# Patient Record
Sex: MALE | Race: WHITE
[De-identification: names, ages, dates, MRNs, and addresses within clinical notes are randomized per-mention and may not be internally consistent; named-entity substitution may affect disease eponyms.]

---

## 2021-03-20 ENCOUNTER — HOSPITAL ENCOUNTER (EMERGENCY)
Dept: HOSPITAL 47 - EC | Age: 21
Discharge: HOME | End: 2021-03-20
Payer: COMMERCIAL

## 2021-03-20 VITALS
SYSTOLIC BLOOD PRESSURE: 113 MMHG | DIASTOLIC BLOOD PRESSURE: 74 MMHG | HEART RATE: 98 BPM | RESPIRATION RATE: 18 BRPM | TEMPERATURE: 98.4 F

## 2021-03-20 DIAGNOSIS — M54.9: Primary | ICD-10-CM

## 2021-03-20 PROCEDURE — 99283 EMERGENCY DEPT VISIT LOW MDM: CPT

## 2021-03-20 PROCEDURE — 71046 X-RAY EXAM CHEST 2 VIEWS: CPT

## 2021-03-20 NOTE — ED
Back Pain HPI





- General


Chief Complaint: Back Pain/Injury


Stated Complaint: Back injury


Time Seen by Provider: 03/20/21 19:19


Source: patient





- History of Present Illness


Initial Comments: 


20-year-old male presenting today for chief complaint of back pain.  Patient 

states he noticed that his back seemed more prominent in certain areas of the 

upper spine.  Patient states has been painful.  Patient does work in a factory. 

He doesn't follow strict injury IV drug use weakness sensation deficits or 

radiation of the pain.  Denies any loss of bowel or bladder control.  Patient 

states he took a picture sent his dad but it looked swollen and told him to come

to the ER.  She states that he became very anxious about this and was 

experiencing dyspnea. He denies chest pain. patient has no additional complai

nts. Upon arrival patient appears well nontoxic in no acute distress.








- Related Data


                                Home Medications











 Medication  Instructions  Recorded  Confirmed


 


No Known Home Medications  11/14/18 11/14/18











                                    Allergies











Allergy/AdvReac Type Severity Reaction Status Date / Time


 


No Known Allergies Allergy   Verified 03/20/21 19:17














Review of Systems


ROS Statement: 


Those systems with pertinent positive or pertinent negative responses have been 

documented in the HPI.





ROS Other: All systems not noted in ROS Statement are negative.





Past Medical History


Past Medical History: Seizure Disorder


History of Any Multi-Drug Resistant Organisms: None Reported


Past Surgical History: No Surgical Hx Reported


Past Psychological History: No Psychological Hx Reported


Smoking Status: Never smoker


Past Alcohol Use History: None Reported


Past Drug Use History: None Reported





General Exam





- General Exam Comments


Initial Comments: 


General:  The patient is awake and alert, in no distress, and does not appear 

acutely ill. 


Eye:  Pupils are equal, round and reactive to light, extra-ocular movements are 

intact.  No nystagmus.  There is normal conjunctiva bilaterally.  No signs of 

icterus.  


Ears, nose, mouth and throat:  There are moist mucous membranes and no oral 

lesions. 


Neck:  The neck is supple, there is no tenderness or JVD. Prominent C7. no pain 




Cardiovascular:  There is a regular rate and rhythm. No murmur, rub or gallop is

appreciated.


Respiratory:  Lungs are clear to auscultation, respirations are non-labored, 

breath sounds are equal.  No wheezes, stridor, rales, or rhonchi.


Musculoskeletal:  Paraspinal tenderness of the upper thoracic spine. No lower 

tenderness. Patient hasno swelling, no redness. and minimal midlines pain. Segundo

bend foreward testing reveals very slight upper thoracic curvature to the right.

Normal ROM, no tenderness.  Strength 5/5. Sensation intact. Radial pulses equal 

bilaterally 2+.  


Neurological:  A&O x 3. CN II-XII intact, There are no obvious motor or sensory 

deficits. Coordination appears grossly intact. Speech is normal.


Skin:  Skin is warm and dry and no rashes or lesions are noted. 


Psychiatric:  Cooperative, appropriate mood & affect, normal judgment.  








Course


                                   Vital Signs











  03/20/21





  19:14


 


Temperature 98.4 F


 


Pulse Rate 98


 


Respiratory 18





Rate 


 


Blood Pressure 113/74


 


O2 Sat by Pulse 100





Oximetry 














Medical Decision Making





- Medical Decision Making


Normal inspection no abnormal swelling, or changes of the spine appreciated. 

Patient did have photo in red lighting that appears like there was prominence 

however this is felt to be anatomical at c7 as well as positional/lighting. 

Given thoracic in nature, patient states he became anxious and had dyspnea that 

subsided with his tall, thin stature i obtained a chest XR to ensure no 

spontaneous pnuemothorax. patient CXR WNL. he appears well nontoxic and will be 

discharged with PCP f/u. Patient is agreeable to dsicharge. patient is 

accompanied by grandmother. case discussed with Dr. Acharya who is agreeable 

to care plan and discharge.








Disposition


Clinical Impression: 


 Back pain





Disposition: HOME SELF-CARE


Condition: Good


Instructions (If sedation given, give patient instructions):  Back Pain (ED)


Additional Instructions: 


Please use medication as discussed.  Please follow-up with family doctor in the 

next 2 days.  Please return to emergency room if the symptoms increase or worsen

or for any other concerns.


Is patient prescribed a controlled substance at d/c from ED?: No


Referrals: 


None,Stated [Primary Care Provider] - 1-2 days


Time of Disposition: 20:00

## 2021-03-20 NOTE — XR
EXAMINATION TYPE: XR chest 2V

 

DATE OF EXAM: 3/20/2021

 

COMPARISON: 11/14/2018

 

HISTORY: Short of breath

 

TECHNIQUE:

 

FINDINGS: Heart and mediastinum are normal. Lungs are clear. Diaphragm is normal. Bony thorax appears
 normal.

 

IMPRESSION: Normal chest. No change.

## 2021-08-12 ENCOUNTER — HOSPITAL ENCOUNTER (EMERGENCY)
Dept: HOSPITAL 47 - EC | Age: 21
Discharge: HOME | End: 2021-08-12
Payer: COMMERCIAL

## 2021-08-12 VITALS — DIASTOLIC BLOOD PRESSURE: 87 MMHG | TEMPERATURE: 98 F | SYSTOLIC BLOOD PRESSURE: 121 MMHG | HEART RATE: 68 BPM

## 2021-08-12 VITALS — RESPIRATION RATE: 16 BRPM

## 2021-08-12 DIAGNOSIS — H53.8: ICD-10-CM

## 2021-08-12 DIAGNOSIS — R42: Primary | ICD-10-CM

## 2021-08-12 LAB
ALBUMIN SERPL-MCNC: 4.8 G/DL (ref 3.5–5)
ALP SERPL-CCNC: 76 U/L (ref 38–126)
ALT SERPL-CCNC: 18 U/L (ref 4–49)
ANION GAP SERPL CALC-SCNC: 10 MMOL/L
AST SERPL-CCNC: 35 U/L (ref 17–59)
BASOPHILS # BLD AUTO: 0 K/UL (ref 0–0.2)
BASOPHILS NFR BLD AUTO: 1 %
BUN SERPL-SCNC: 16 MG/DL (ref 9–20)
CALCIUM SPEC-MCNC: 10 MG/DL (ref 8.4–10.2)
CHLORIDE SERPL-SCNC: 102 MMOL/L (ref 98–107)
CO2 SERPL-SCNC: 27 MMOL/L (ref 22–30)
EOSINOPHIL # BLD AUTO: 0.1 K/UL (ref 0–0.7)
EOSINOPHIL NFR BLD AUTO: 2 %
ERYTHROCYTE [DISTWIDTH] IN BLOOD BY AUTOMATED COUNT: 4.89 M/UL (ref 4.3–5.9)
ERYTHROCYTE [DISTWIDTH] IN BLOOD: 12.9 % (ref 11.5–15.5)
GLUCOSE SERPL-MCNC: 77 MG/DL (ref 74–99)
HCT VFR BLD AUTO: 43 % (ref 39–53)
HGB BLD-MCNC: 15.4 GM/DL (ref 13–17.5)
LYMPHOCYTES # SPEC AUTO: 1.4 K/UL (ref 1–4.8)
LYMPHOCYTES NFR SPEC AUTO: 34 %
MCH RBC QN AUTO: 31.5 PG (ref 25–35)
MCHC RBC AUTO-ENTMCNC: 35.7 G/DL (ref 31–37)
MCV RBC AUTO: 88 FL (ref 80–100)
MONOCYTES # BLD AUTO: 0.3 K/UL (ref 0–1)
MONOCYTES NFR BLD AUTO: 7 %
NEUTROPHILS # BLD AUTO: 2.3 K/UL (ref 1.3–7.7)
NEUTROPHILS NFR BLD AUTO: 54 %
PH UR: 6 [PH] (ref 5–8)
PLATELET # BLD AUTO: 179 K/UL (ref 150–450)
POTASSIUM SERPL-SCNC: 4.4 MMOL/L (ref 3.5–5.1)
PROT SERPL-MCNC: 7.4 G/DL (ref 6.3–8.2)
SODIUM SERPL-SCNC: 139 MMOL/L (ref 137–145)
SP GR UR: 1.01 (ref 1–1.03)
UROBILINOGEN UR QL STRIP: <2 MG/DL (ref ?–2)
WBC # BLD AUTO: 4.3 K/UL (ref 4–11)

## 2021-08-12 PROCEDURE — 85025 COMPLETE CBC W/AUTO DIFF WBC: CPT

## 2021-08-12 PROCEDURE — 36415 COLL VENOUS BLD VENIPUNCTURE: CPT

## 2021-08-12 PROCEDURE — 99284 EMERGENCY DEPT VISIT MOD MDM: CPT

## 2021-08-12 PROCEDURE — 80053 COMPREHEN METABOLIC PANEL: CPT

## 2021-08-12 PROCEDURE — 96361 HYDRATE IV INFUSION ADD-ON: CPT

## 2021-08-12 PROCEDURE — 81003 URINALYSIS AUTO W/O SCOPE: CPT

## 2021-08-12 PROCEDURE — 83605 ASSAY OF LACTIC ACID: CPT

## 2021-08-12 PROCEDURE — 93005 ELECTROCARDIOGRAM TRACING: CPT

## 2021-08-12 PROCEDURE — 84484 ASSAY OF TROPONIN QUANT: CPT

## 2021-08-12 PROCEDURE — 96360 HYDRATION IV INFUSION INIT: CPT

## 2021-08-12 NOTE — ED
General Adult HPI





- General


Chief complaint: Dizziness


Stated complaint: Dizziness


Time Seen by Provider: 08/12/21 10:32


Source: patient


Mode of arrival: wheelchair


Limitations: no limitations





- History of Present Illness


Initial comments: 


21 year-old male patient presents to the emergency department for evaluation of 

dizziness and blurred vision. Patient states symptoms started around 0800 this 

morning while at work. 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 States 

that it has been really hot in the factory for the last several days. States he 

has been sweating a lot. States he drinks "a lot" of water. States he did have 

mild headache earlier today but it has resolved. He denies any head injury. 

Denies chest pain or shortness of breath. States he has had these same symptoms 

numerous times in the past, has never been evaluated.  Patient denies any recent

rash, cough, shortness of breath, chest pain, abdominal pain, nausea, vomiting, 

diarrhea, constipation, back pain, numbness, tingling, hematuria, dysuria, 

urinary urgency, urinary frequency, or any other complaints.








- Related Data


                                Home Medications











 Medication  Instructions  Recorded  Confirmed


 


No Known Home Medications  11/14/18 08/12/21











                                    Allergies











Allergy/AdvReac Type Severity Reaction Status Date / Time


 


No Known Allergies Allergy   Verified 08/12/21 11:13














Review of Systems


ROS Statement: 


Those systems with pertinent positive or pertinent negative responses have been 

documented in the HPI.





ROS Other: All systems not noted in ROS Statement are negative.





Past Medical History


Past Medical History: Seizure Disorder


History of Any Multi-Drug Resistant Organisms: None Reported


Past Surgical History: No Surgical Hx Reported


Past Psychological History: No Psychological Hx Reported


Smoking Status: Never smoker


Past Alcohol Use History: None Reported


Past Drug Use History: None Reported





General Exam


Limitations: no limitations


General appearance: alert, in no apparent distress, other (This is a well-

developed, well-nourished adult male patient in no acute distress.  Vital signs 

upon presentation are temperature 97.8F, pulse 68, respirations 18, blood 

pressure 121/81, pulse ox 100% on room air.)


Eye exam: Present: normal appearance, PERRL, EOMI.  Absent: scleral icterus, 

conjunctival injection, periorbital swelling


ENT exam: Present: normal exam, normal oropharynx, mucous membranes moist


Respiratory exam: Present: normal lung sounds bilaterally.  Absent: respiratory 

distress, wheezes, rales, rhonchi, stridor


Cardiovascular Exam: Present: regular rate, normal rhythm, normal heart sounds. 

Absent: systolic murmur, diastolic murmur, rubs, gallop, clicks


GI/Abdominal exam: Present: soft, normal bowel sounds.  Absent: distended, 

tenderness, guarding, rebound, rigid


Neurological exam: Present: alert, oriented X3, CN II-XII intact


  ** Expanded


Speech: Present: fluid speech


Cranial nerves: EOM's Intact: Normal, Nystagmus: Normal


Motor strength exam: RUE: 5, LUE: 5, RLE: 5, LLE: 5


Psychiatric exam: Present: normal affect, normal mood


Skin exam: Present: warm, dry, intact, normal color.  Absent: rash





Course


                                   Vital Signs











  08/12/21 08/12/21 08/12/21





  10:18 12:58 13:09


 


Temperature 97.8 F  98.0 F


 


Pulse Rate 68 67 68


 


Respiratory 18 16 16





Rate   


 


Blood Pressure 121/81 128/80 121/87


 


O2 Sat by Pulse 100 100 99





Oximetry   














EKG Findings





- EKG Comments:


EKG Findings:: EKG obtained at 1026 shows normal sinus rhythm with an incomplete

right bundle branch block.  Ventricular rate is 69, MO interval 194, QRS 

duration 94, , .  No evidence of ST elevation or depression.





Medical Decision Making





- Medical Decision Making


21-year-old male patient presents to the emergency department today for 

evaluation of dizziness and blurred vision.  Physical examination is 

unremarkable.  He is neurologically intact with no focal deficits.  EKG shows 

incomplete right bundle branch block.  Vital signs are within normal range.  

Labs reviewed and were unremarkable.  I did discuss findings and results with 

him.  He is instructed to increase fluids and to rest.  He is instructed to 

follow-up with his primary care physician for recheck in 1-2 days.  He is 

instructed to discuss possible heart monitoring since he has been having 

frequent dizzy episodes.  Return parameters were discussed in detail.  He 

verbalizes understanding and agrees with this plan.  Case discussed with my 

attending Dr. Daugherty.








- Lab Data


Result diagrams: 


                                 08/12/21 11:25





                                 08/12/21 11:25


                                   Lab Results











  08/12/21 08/12/21 08/12/21 Range/Units





  11:25 11:25 11:25 


 


WBC  4.3    (4.0-11.0)  k/uL


 


RBC  4.89    (4.30-5.90)  m/uL


 


Hgb  15.4    (13.0-17.5)  gm/dL


 


Hct  43.0    (39.0-53.0)  %


 


MCV  88.0    (80.0-100.0)  fL


 


MCH  31.5    (25.0-35.0)  pg


 


MCHC  35.7    (31.0-37.0)  g/dL


 


RDW  12.9    (11.5-15.5)  %


 


Plt Count  179    (150-450)  k/uL


 


MPV  7.4    


 


Neutrophils %  54    %


 


Lymphocytes %  34    %


 


Monocytes %  7    %


 


Eosinophils %  2    %


 


Basophils %  1    %


 


Neutrophils #  2.3    (1.3-7.7)  k/uL


 


Lymphocytes #  1.4    (1.0-4.8)  k/uL


 


Monocytes #  0.3    (0-1.0)  k/uL


 


Eosinophils #  0.1    (0-0.7)  k/uL


 


Basophils #  0.0    (0-0.2)  k/uL


 


Sodium   139   (137-145)  mmol/L


 


Potassium   4.4   (3.5-5.1)  mmol/L


 


Chloride   102   ()  mmol/L


 


Carbon Dioxide   27   (22-30)  mmol/L


 


Anion Gap   10   mmol/L


 


BUN   16   (9-20)  mg/dL


 


Creatinine   0.87   (0.66-1.25)  mg/dL


 


Est GFR (CKD-EPI)AfAm   >90   (>60 ml/min/1.73 sqM)  


 


Est GFR (CKD-EPI)NonAf   >90   (>60 ml/min/1.73 sqM)  


 


Glucose   77   (74-99)  mg/dL


 


Plasma Lactic Acid Clint    0.7  (0.7-2.0)  mmol/L


 


Calcium   10.0   (8.4-10.2)  mg/dL


 


Total Bilirubin   1.6 H   (0.2-1.3)  mg/dL


 


AST   35   (17-59)  U/L


 


ALT   18   (4-49)  U/L


 


Alkaline Phosphatase   76   ()  U/L


 


Troponin I     (0.000-0.034)  ng/mL


 


Total Protein   7.4   (6.3-8.2)  g/dL


 


Albumin   4.8   (3.5-5.0)  g/dL


 


Urine Color     


 


Urine Appearance     (Clear)  


 


Urine pH     (5.0-8.0)  


 


Ur Specific Gravity     (1.001-1.035)  


 


Urine Protein     (Negative)  


 


Urine Glucose (UA)     (Negative)  


 


Urine Ketones     (Negative)  


 


Urine Blood     (Negative)  


 


Urine Nitrite     (Negative)  


 


Urine Bilirubin     (Negative)  


 


Urine Urobilinogen     (<2.0)  mg/dL


 


Ur Leukocyte Esterase     (Negative)  














  08/12/21 08/12/21 Range/Units





  11:25 11:30 


 


WBC    (4.0-11.0)  k/uL


 


RBC    (4.30-5.90)  m/uL


 


Hgb    (13.0-17.5)  gm/dL


 


Hct    (39.0-53.0)  %


 


MCV    (80.0-100.0)  fL


 


MCH    (25.0-35.0)  pg


 


MCHC    (31.0-37.0)  g/dL


 


RDW    (11.5-15.5)  %


 


Plt Count    (150-450)  k/uL


 


MPV    


 


Neutrophils %    %


 


Lymphocytes %    %


 


Monocytes %    %


 


Eosinophils %    %


 


Basophils %    %


 


Neutrophils #    (1.3-7.7)  k/uL


 


Lymphocytes #    (1.0-4.8)  k/uL


 


Monocytes #    (0-1.0)  k/uL


 


Eosinophils #    (0-0.7)  k/uL


 


Basophils #    (0-0.2)  k/uL


 


Sodium    (137-145)  mmol/L


 


Potassium    (3.5-5.1)  mmol/L


 


Chloride    ()  mmol/L


 


Carbon Dioxide    (22-30)  mmol/L


 


Anion Gap    mmol/L


 


BUN    (9-20)  mg/dL


 


Creatinine    (0.66-1.25)  mg/dL


 


Est GFR (CKD-EPI)AfAm    (>60 ml/min/1.73 sqM)  


 


Est GFR (CKD-EPI)NonAf    (>60 ml/min/1.73 sqM)  


 


Glucose    (74-99)  mg/dL


 


Plasma Lactic Acid Clint    (0.7-2.0)  mmol/L


 


Calcium    (8.4-10.2)  mg/dL


 


Total Bilirubin    (0.2-1.3)  mg/dL


 


AST    (17-59)  U/L


 


ALT    (4-49)  U/L


 


Alkaline Phosphatase    ()  U/L


 


Troponin I  <0.012   (0.000-0.034)  ng/mL


 


Total Protein    (6.3-8.2)  g/dL


 


Albumin    (3.5-5.0)  g/dL


 


Urine Color   Yellow  


 


Urine Appearance   Clear  (Clear)  


 


Urine pH   6.0  (5.0-8.0)  


 


Ur Specific Gravity   1.015  (1.001-1.035)  


 


Urine Protein   Negative  (Negative)  


 


Urine Glucose (UA)   Negative  (Negative)  


 


Urine Ketones   Negative  (Negative)  


 


Urine Blood   Negative  (Negative)  


 


Urine Nitrite   Negative  (Negative)  


 


Urine Bilirubin   Negative  (Negative)  


 


Urine Urobilinogen   <2.0  (<2.0)  mg/dL


 


Ur Leukocyte Esterase   Negative  (Negative)  














Disposition


Clinical Impression: 


 Dizziness, Blurred vision





Disposition: HOME SELF-CARE


Condition: Good


Instructions (If sedation given, give patient instructions):  Blurred Vision 

(ED), Dizziness (ED)


Additional Instructions: 


Increase fluids. Rest. Follow up with your primary care physician for recheck in

1-2 days. Discuss possible heart monitoring. Return for any new, worsening, or 

concerning symptoms.


Is patient prescribed a controlled substance at d/c from ED?: No


Referrals: 


None,Stated [Primary Care Provider] - 1-2 days


Time of Disposition: 12:41

## 2021-08-18 ENCOUNTER — HOSPITAL ENCOUNTER (EMERGENCY)
Dept: HOSPITAL 47 - EC | Age: 21
LOS: 1 days | Discharge: HOME | End: 2021-08-19
Payer: COMMERCIAL

## 2021-08-18 DIAGNOSIS — J06.9: Primary | ICD-10-CM

## 2021-08-18 DIAGNOSIS — F17.210: ICD-10-CM

## 2021-08-18 DIAGNOSIS — Z20.822: ICD-10-CM

## 2021-08-18 PROCEDURE — 71045 X-RAY EXAM CHEST 1 VIEW: CPT

## 2021-08-18 PROCEDURE — 87635 SARS-COV-2 COVID-19 AMP PRB: CPT

## 2021-08-18 PROCEDURE — 99283 EMERGENCY DEPT VISIT LOW MDM: CPT

## 2021-08-19 VITALS
DIASTOLIC BLOOD PRESSURE: 63 MMHG | SYSTOLIC BLOOD PRESSURE: 107 MMHG | TEMPERATURE: 98.2 F | HEART RATE: 80 BPM | RESPIRATION RATE: 16 BRPM

## 2021-08-19 NOTE — ED
URI HPI





- General


Chief Complaint: Upper Respiratory Infection


Stated Complaint: cough, fever


Time Seen by Provider: 08/19/21 00:06


Source: patient, family


Mode of arrival: wheelchair


Limitations: no limitations





- History of Present Illness


Initial Comments: 


21-year-old male patient presents to the emergency department today for 

evaluation of cough, congestion, sore throat, body aches, fever, chills.  

Patient states symptoms started about 24 hours ago.  Denies taking any 

medication for his symptoms.  Does admit to smoking cigarettes.  Has not had COV

ID-19.  Mother is sick with similar symptoms.   Patient denies any recent rash, 

shortness of breath, chest pain, abdominal pain, nausea, vomiting, diarrhea, 

constipation, back pain, numbness, tingling, dizziness, weakness, hematuria, 

dysuria, urinary urgency, urinary frequency, headache, visual changes, or any 

other complaints.








- Related Data


                                  Previous Rx's











 Medication  Instructions  Recorded


 


Ibuprofen [Motrin] 600 mg PO Q8HR PRN #30 tab 08/19/21


 


guaiFENesin-/30MG [Mucinex 2 each PO Q12HR PRN #20 tab.er.12h 08/19/21





Dm]  











                                    Allergies











Allergy/AdvReac Type Severity Reaction Status Date / Time


 


No Known Allergies Allergy   Verified 08/18/21 23:14














Review of Systems


ROS Statement: 


Those systems with pertinent positive or pertinent negative responses have been 

documented in the HPI.





ROS Other: All systems not noted in ROS Statement are negative.





Past Medical History


Past Medical History: Seizure Disorder


History of Any Multi-Drug Resistant Organisms: None Reported


Past Surgical History: No Surgical Hx Reported


Past Psychological History: No Psychological Hx Reported


Smoking Status: Never smoker


Past Alcohol Use History: None Reported


Past Drug Use History: None Reported





General Exam


Limitations: no limitations


General appearance: alert, in no apparent distress, other (This is a well-

developed, well-nourished adult male patient in no acute distress.  Vital signs 

upon presentation are temperature 100.7F, pulse 83, respirations 18, blood 

pressure 132/81, pulse ox 100% on room air.)


ENT exam: Present: normal exam, normal oropharynx, mucous membranes moist


Respiratory exam: Present: normal lung sounds bilaterally.  Absent: respiratory 

distress, wheezes, rales, rhonchi, stridor


Cardiovascular Exam: Present: regular rate, normal rhythm, normal heart sounds. 

Absent: systolic murmur, diastolic murmur, rubs, gallop, clicks


GI/Abdominal exam: Present: soft, normal bowel sounds.  Absent: distended, 

tenderness, guarding, rebound, rigid


Neurological exam: Present: alert, oriented X3, CN II-XII intact


Psychiatric exam: Present: normal affect, normal mood


Skin exam: Present: warm, dry, intact, normal color.  Absent: rash





Course


                                   Vital Signs











  08/18/21 08/19/21 08/19/21





  23:11 00:52 01:33


 


Temperature 100.7 F H 98.2 F 


 


Pulse Rate 83 80 


 


Respiratory 18 16 16





Rate   


 


Blood Pressure 132/81 107/63 


 


O2 Sat by Pulse 100 97 





Oximetry   














Medical Decision Making





- Medical Decision Making


21-year-old male patient presents to the emergency department today for 

evaluation of upper respiratory symptoms and fever.  Physical examination did 

reveal clear equal lung sounds.  There is some mild pharyngeal erythema.  No 

evidence for otitis media.  Chest x-ray was negative. COVID-19 test is negative.

He will be discharged with Mucinex and ibuprofen. Instructed to rest and 

increase fluids. Instructed to follow-up the primary care physician for recheck 

in 1-2 days.  Return parameters were discussed in detail.  Patient verbalizes 

understanding and agrees with this plan.  My attending is Dr. Moreno.








- Lab Data


                                   Lab Results











  08/18/21 Range/Units





  23:33 


 


Coronavirus (PCR)  Not Detected  (Not Detectd)  














Disposition


Clinical Impression: 


 Viral upper respiratory infection





Disposition: HOME SELF-CARE


Condition: Good


Instructions (If sedation given, give patient instructions):  Upper Respiratory 

Infection (ED)


Additional Instructions: 


Rest.  Increase fluids.  Alternate Tylenol and Motrin for pain and fever 

control.  Take medications as directed for symptom relief.  Return to the 

emergency department for any new, worsening, or concerning symptoms.


Prescriptions: 


Ibuprofen [Motrin] 600 mg PO Q8HR PRN #30 tab


 PRN Reason: Pain


guaiFENesin-/30MG [Mucinex Dm] 2 each PO Q12HR PRN #20 tab.er.12h


 PRN Reason: Cough


Is patient prescribed a controlled substance at d/c from ED?: No


Referrals: 


Deniz Barnes MD [Primary Care Provider] - 1-2 days


Time of Disposition: 01:16

## 2021-08-19 NOTE — XR
EXAMINATION TYPE: XR chest 1V

 

DATE OF EXAM: 8/19/2021

 

COMPARISON: 3/20/2021

 

HISTORY: Cough. Short of breath.

 

TECHNIQUE: Single view

 

FINDINGS: Heart and mediastinum are normal. Lungs are clear. Diaphragm is normal. Bony thorax is inta
ct.

 

IMPRESSION: Normal chest. No change.

## 2021-09-16 ENCOUNTER — HOSPITAL ENCOUNTER (OUTPATIENT)
Dept: HOSPITAL 47 - RADMRIMAIN | Age: 21
Discharge: HOME | End: 2021-09-16
Attending: FAMILY MEDICINE
Payer: COMMERCIAL

## 2021-09-16 DIAGNOSIS — R56.9: Primary | ICD-10-CM

## 2021-09-16 DIAGNOSIS — R55: ICD-10-CM

## 2021-09-16 PROCEDURE — 70553 MRI BRAIN STEM W/O & W/DYE: CPT

## 2021-09-20 NOTE — MR
EXAMINATION TYPE: MR brain wo/w con

 

DATE OF EXAM: 9/16/2021

 

COMPARISON: CT 11/14/2018

 

HISTORY: 21-year-old male R56.9, R55. Seizure, syncope.

 

TECHNIQUE:  Multiplanar, multisequence images of the brain and brainstem were acquired before and aft
er administration of  5.5 mL IV Gadavist.  Diffusion weighted imaging is performed. The initial injec
tion did not reveal any enhancing vasculature. The patient was called back on 9/18/2021 and reinjecte
d with 5.5 mL IV Gadavist.

 

FINDINGS:  

No evidence for acute infarction, hemorrhage, mass, mass effect, midline shift, herniation, effacemen
t of basal cisterns, or extra-axial fluid collection. 

 

The ventricles and sulci are age-appropriate.

 

Major intracranial flow voids are intact.

 

T2/FLAIR weighted sequences show no white matter signal abnormality.

 

No gray matter heterotopia. No hippocampal volume loss.

 

Midline structures demonstrate normal morphology.  The craniocervical junction is normal. 

 

Post contrast images demonstrate no evidence of pathologic enhancement.  Dural venous sinuses are pat
ent.

 

Scattered mild mucosal thickening ethmoid air cells and right greater than left maxillary sinuses. Gl
obes are intact.

 

 

IMPRESSION:

Mild chronic ethmoid and maxillary sinus disease. Otherwise, unremarkable MRI brain. No enhancing les
ion seen.